# Patient Record
Sex: MALE | URBAN - METROPOLITAN AREA
[De-identification: names, ages, dates, MRNs, and addresses within clinical notes are randomized per-mention and may not be internally consistent; named-entity substitution may affect disease eponyms.]

---

## 2021-09-21 ENCOUNTER — APPOINTMENT (RX ONLY)
Dept: URBAN - METROPOLITAN AREA CLINIC 156 | Facility: CLINIC | Age: 36
Setting detail: DERMATOLOGY
End: 2021-09-21

## 2021-09-21 DIAGNOSIS — B07.8 OTHER VIRAL WARTS: ICD-10-CM

## 2021-09-21 PROCEDURE — ? DIAGNOSIS COMMENT

## 2021-09-21 PROCEDURE — ? TREATMENT REGIMEN

## 2021-09-21 PROCEDURE — ? COUNSELING

## 2021-09-21 PROCEDURE — 99203 OFFICE O/P NEW LOW 30 MIN: CPT

## 2021-09-21 ASSESSMENT — LOCATION DETAILED DESCRIPTION DERM
LOCATION DETAILED: RIGHT SMALL DISTAL INTERPHALANGEAL JOINT
LOCATION DETAILED: RIGHT DISTAL DORSAL RING FINGER
LOCATION DETAILED: RIGHT DISTAL ULNAR THUMB
LOCATION DETAILED: RIGHT DISTAL DORSAL INDEX FINGER
LOCATION DETAILED: RIGHT DISTAL DORSAL MIDDLE FINGER

## 2021-09-21 ASSESSMENT — LOCATION ZONE DERM: LOCATION ZONE: FINGER

## 2021-09-21 ASSESSMENT — LOCATION SIMPLE DESCRIPTION DERM
LOCATION SIMPLE: RIGHT INDEX FINGER
LOCATION SIMPLE: RIGHT MIDDLE FINGER
LOCATION SIMPLE: RIGHT SMALL FINGER
LOCATION SIMPLE: RIGHT RING FINGER
LOCATION SIMPLE: RIGHT THUMB

## 2021-09-21 NOTE — PROCEDURE: TREATMENT REGIMEN
Initiate Treatment: 5-Fluorouracil 2.5% with Salicylic Acid 17% in Ocllusagel wart kit
Detail Level: Simple

## 2021-09-21 NOTE — PROCEDURE: DIAGNOSIS COMMENT
Comment: Advised Zinc sulfate 220mg BID and beta carotene 25,000 IU daily.  Cimetidine had interaction with metformin.
Detail Level: Simple
Render Risk Assessment In Note?: no

## 2021-10-21 ENCOUNTER — APPOINTMENT (RX ONLY)
Dept: URBAN - METROPOLITAN AREA CLINIC 156 | Facility: CLINIC | Age: 36
Setting detail: DERMATOLOGY
End: 2021-10-21

## 2021-10-21 DIAGNOSIS — B07.8 OTHER VIRAL WARTS: ICD-10-CM

## 2021-10-21 PROCEDURE — ? TREATMENT REGIMEN

## 2021-10-21 PROCEDURE — ? COUNSELING

## 2021-10-21 PROCEDURE — 17110 DESTRUCTION B9 LES UP TO 14: CPT

## 2021-10-21 PROCEDURE — ? BENIGN DESTRUCTION

## 2021-10-21 ASSESSMENT — LOCATION SIMPLE DESCRIPTION DERM
LOCATION SIMPLE: RIGHT INDEX FINGER
LOCATION SIMPLE: RIGHT THUMB
LOCATION SIMPLE: RIGHT MIDDLE FINGER
LOCATION SIMPLE: RIGHT SMALL FINGER
LOCATION SIMPLE: RIGHT RING FINGER

## 2021-10-21 ASSESSMENT — LOCATION DETAILED DESCRIPTION DERM
LOCATION DETAILED: RIGHT DISTAL DORSAL MIDDLE FINGER
LOCATION DETAILED: RIGHT DISTAL DORSAL INDEX FINGER
LOCATION DETAILED: RIGHT DISTAL DORSAL RING FINGER
LOCATION DETAILED: RIGHT SMALL DISTAL INTERPHALANGEAL JOINT
LOCATION DETAILED: RIGHT DISTAL ULNAR THUMB

## 2021-10-21 ASSESSMENT — LOCATION ZONE DERM: LOCATION ZONE: FINGER

## 2021-10-21 NOTE — PROCEDURE: TREATMENT REGIMEN
Continue Regimen: Zinc sulfate 220mg BID and beta carotene 25,000 IU daily. \\n\\n5-Fluorouracil 2.5% with Salicylic Acid 17% in Ocllusagel wart kit
Detail Level: Simple

## 2021-10-21 NOTE — PROCEDURE: BENIGN DESTRUCTION
Treatment Number (Will Not Render If 0): 0
Anesthesia Volume In Cc: 0.1
Render Post-Care Instructions In Note?: no
Detail Level: Detailed
Post-Care Instructions: I reviewed with the patient in detail post-care instructions. Patient is to wear sunprotection, and avoid picking at any of the treated lesions. Pt may apply Vaseline to crusted or scabbing areas.
Consent: The patient's consent was obtained including but not limited to risks of crusting, scabbing, blistering, scarring, darker or lighter pigmentary change, recurrence, incomplete removal and infection.
Medical Necessity Clause: This procedure was medically necessary because the lesions that were treated were:
Medical Necessity Information: It is in your best interest to select a reason for this procedure from the list below. All of these items fulfill various CMS LCD requirements except the new and changing color options.

## 2021-12-09 ENCOUNTER — APPOINTMENT (RX ONLY)
Dept: URBAN - METROPOLITAN AREA CLINIC 156 | Facility: CLINIC | Age: 36
Setting detail: DERMATOLOGY
End: 2021-12-09

## 2021-12-09 DIAGNOSIS — B07.8 OTHER VIRAL WARTS: ICD-10-CM

## 2021-12-09 PROCEDURE — ? IMMUNOTHERAPY: CANDIDA ANTIGEN

## 2021-12-09 PROCEDURE — ? COUNSELING

## 2021-12-09 PROCEDURE — 11900 INJECT SKIN LESIONS </W 7: CPT

## 2021-12-09 PROCEDURE — ? DIAGNOSIS COMMENT

## 2021-12-09 ASSESSMENT — LOCATION DETAILED DESCRIPTION DERM
LOCATION DETAILED: LEFT DISTAL RADIAL PALMAR INDEX FINGER
LOCATION DETAILED: RIGHT DISTAL RADIAL DORSAL INDEX FINGER
LOCATION DETAILED: RIGHT DISTAL PALMAR INDEX FINGER
LOCATION DETAILED: RIGHT PROXIMAL PALMAR INDEX FINGER
LOCATION DETAILED: LEFT DISTAL PALMAR SMALL FINGER
LOCATION DETAILED: LEFT DISTAL PALMAR INDEX FINGER

## 2021-12-09 ASSESSMENT — LOCATION SIMPLE DESCRIPTION DERM
LOCATION SIMPLE: LEFT SMALL FINGER
LOCATION SIMPLE: LEFT INDEX FINGER
LOCATION SIMPLE: RIGHT INDEX FINGER

## 2021-12-09 ASSESSMENT — LOCATION ZONE DERM: LOCATION ZONE: FINGER

## 2021-12-09 NOTE — PROCEDURE: DIAGNOSIS COMMENT
Detail Level: Simple
Comment: Patient told to DC zinc at this time (long term can lead to copper deficiency)
Render Risk Assessment In Note?: no

## 2021-12-09 NOTE — PROCEDURE: IMMUNOTHERAPY: CANDIDA ANTIGEN
Post-Care Instructions: I reviewed with the patient in detail post-care instructions. Mild to moderate itching, tenderness, or swelling at the injection site is common and usually lasts 24 to 48 hours. The patient may elevate the painful area, apply ice for 5 minutes at a time, take tylenol every 4 to 6 hours. Only 5% of Candida immunotherapy patients get flu-like symptoms. This usually lasts only 24 to 48 hours. It is possible to prevent this at future visits by taking tylenol before the injection. If warts are on the fingers, all rings should be removed for 2 days post treatment. The patient understands that multiple treatments may be needed and there is no gaurantee of improvement.
Render Post-Care Instructions In Note?: yes
Bill ?: No
J-Code Units: 1
Treatment #: 2
Detail Level: Detailed
Total Amount Injected In Ccs: 0.1
Consent was obtained from the patient. The risks of candida antigen injection were discussed in detail. Specifically, the risks of redness, itching, pain and allergic reactions were addressed. Prior to injection all of the patient's questions were answered.

## 2022-01-13 ENCOUNTER — APPOINTMENT (RX ONLY)
Dept: URBAN - METROPOLITAN AREA CLINIC 156 | Facility: CLINIC | Age: 37
Setting detail: DERMATOLOGY
End: 2022-01-13

## 2022-01-13 VITALS — HEIGHT: 76 IN | WEIGHT: 250 LBS

## 2022-01-13 DIAGNOSIS — B07.8 OTHER VIRAL WARTS: ICD-10-CM

## 2022-01-13 PROCEDURE — 11900 INJECT SKIN LESIONS </W 7: CPT

## 2022-01-13 PROCEDURE — ? IMMUNOTHERAPY: CANDIDA ANTIGEN

## 2022-01-13 PROCEDURE — ? COUNSELING

## 2022-01-13 ASSESSMENT — LOCATION DETAILED DESCRIPTION DERM
LOCATION DETAILED: LEFT DISTAL PALMAR INDEX FINGER
LOCATION DETAILED: LEFT DISTAL RADIAL PALMAR INDEX FINGER

## 2022-01-13 ASSESSMENT — LOCATION ZONE DERM: LOCATION ZONE: FINGER

## 2022-01-13 ASSESSMENT — LOCATION SIMPLE DESCRIPTION DERM: LOCATION SIMPLE: LEFT INDEX FINGER

## 2022-01-13 NOTE — PROCEDURE: IMMUNOTHERAPY: CANDIDA ANTIGEN
Post-Care Instructions: I reviewed with the patient in detail post-care instructions. Mild to moderate itching, tenderness, or swelling at the injection site is common and usually lasts 24 to 48 hours. The patient may elevate the painful area, apply ice for 5 minutes at a time, take tylenol every 4 to 6 hours. Only 5% of Candida immunotherapy patients get flu-like symptoms. This usually lasts only 24 to 48 hours. It is possible to prevent this at future visits by taking tylenol before the injection. If warts are on the fingers, all rings should be removed for 2 days post treatment. The patient understands that multiple treatments may be needed and there is no gaurantee of improvement.
Render Post-Care Instructions In Note?: yes
Bill ?: No
J-Code Units: 1
Treatment #: 3
Detail Level: Detailed
Total Amount Injected In Ccs: 0.1
Consent was obtained from the patient. The risks of candida antigen injection were discussed in detail. Specifically, the risks of redness, itching, pain and allergic reactions were addressed. Prior to injection all of the patient's questions were answered.

## 2022-03-03 ENCOUNTER — APPOINTMENT (RX ONLY)
Dept: URBAN - METROPOLITAN AREA CLINIC 156 | Facility: CLINIC | Age: 37
Setting detail: DERMATOLOGY
End: 2022-03-03

## 2022-03-03 VITALS — HEIGHT: 74 IN | WEIGHT: 155 LBS

## 2022-03-03 DIAGNOSIS — L91.8 OTHER HYPERTROPHIC DISORDERS OF THE SKIN: ICD-10-CM | Status: STABLE

## 2022-03-03 DIAGNOSIS — B07.8 OTHER VIRAL WARTS: ICD-10-CM | Status: IMPROVED

## 2022-03-03 DIAGNOSIS — L72.8 OTHER FOLLICULAR CYSTS OF THE SKIN AND SUBCUTANEOUS TISSUE: ICD-10-CM | Status: STABLE

## 2022-03-03 DIAGNOSIS — L30.4 ERYTHEMA INTERTRIGO: ICD-10-CM | Status: INADEQUATELY CONTROLLED

## 2022-03-03 PROCEDURE — ? RECOMMENDATIONS

## 2022-03-03 PROCEDURE — ? COUNSELING

## 2022-03-03 PROCEDURE — 99213 OFFICE O/P EST LOW 20 MIN: CPT

## 2022-03-03 PROCEDURE — ? PRESCRIPTION

## 2022-03-03 PROCEDURE — ? TREATMENT REGIMEN

## 2022-03-03 RX ORDER — KETOCONAZOLE 20 MG/G
CREAM TOPICAL
Qty: 60 | Refills: 2 | Status: ERX | COMMUNITY
Start: 2022-03-03

## 2022-03-03 RX ORDER — HYDROCORTISONE 25 MG/G
CREAM TOPICAL
Qty: 30 | Refills: 2 | Status: ERX | COMMUNITY
Start: 2022-03-03

## 2022-03-03 RX ADMIN — KETOCONAZOLE: 20 CREAM TOPICAL at 00:00

## 2022-03-03 RX ADMIN — HYDROCORTISONE: 25 CREAM TOPICAL at 00:00

## 2022-03-03 ASSESSMENT — LOCATION DETAILED DESCRIPTION DERM
LOCATION DETAILED: LEFT ANTERIOR SCROTUM
LOCATION DETAILED: LEFT DISTAL RADIAL PALMAR INDEX FINGER
LOCATION DETAILED: LEFT ANTERIOR MEDIAL PROXIMAL THIGH

## 2022-03-03 ASSESSMENT — LOCATION SIMPLE DESCRIPTION DERM
LOCATION SIMPLE: LEFT INDEX FINGER
LOCATION SIMPLE: LEFT THIGH
LOCATION SIMPLE: SCROTUM

## 2022-03-03 ASSESSMENT — LOCATION ZONE DERM
LOCATION ZONE: GENITALIA
LOCATION ZONE: LEG
LOCATION ZONE: FINGER

## 2022-03-03 NOTE — HPI: CYST
Report given to nurse on [address], room is ready for patient, patient belongs have been packed and transport has been paged to take patient to [address] [place] [address].  
How Severe Is Your Cyst?: mild
Is This A New Presentation, Or A Follow-Up?: Cyst

## 2022-03-03 NOTE — PROCEDURE: RECOMMENDATIONS
Recommendations (Free Text): Provider recommended Zeasorb powder
Detail Level: Zone
Render Risk Assessment In Note?: no

## 2022-03-03 NOTE — PROCEDURE: TREATMENT REGIMEN
Plan: Provider recommended mixing both the hydrocortisone cream and ketoconazole cream together and applying to the groin area as needed for flares.
Detail Level: Simple
Initiate Treatment: ketoconazole 2 % topical cream: Apply by topical route to the affected areas on the groin area as needed for flares.\\n\\nhydrocortisone 2.5 % topical cream: Apply to affected areas on the groin area as needed for flares.

## 2022-03-07 RX ORDER — KETOCONAZOLE 20 MG/G
CREAM TOPICAL
Qty: 60 | Refills: 2 | Status: ERX

## 2022-03-08 RX ORDER — KETOCONAZOLE 20 MG/G
CREAM TOPICAL
Qty: 3 | Refills: 2 | Status: ERX

## 2022-03-08 RX ORDER — HYDROCORTISONE 25 MG/G
CREAM TOPICAL
Qty: 454 | Refills: 2 | Status: ERX